# Patient Record
Sex: FEMALE | Race: BLACK OR AFRICAN AMERICAN | Employment: UNEMPLOYED | URBAN - METROPOLITAN AREA
[De-identification: names, ages, dates, MRNs, and addresses within clinical notes are randomized per-mention and may not be internally consistent; named-entity substitution may affect disease eponyms.]

---

## 2017-05-31 LAB
ANTIBODY SCREEN, EXTERNAL: NEGATIVE
CHLAMYDIA, EXTERNAL: NEGATIVE
HBSAG, EXTERNAL: NEGATIVE
HCT, EXTERNAL: 30.7
HGB, EXTERNAL: 9.9
HIV, EXTERNAL: NON REACTIVE
N. GONORRHEA, EXTERNAL: NEGATIVE
PLATELET CNT,   EXTERNAL: 229
RUBELLA, EXTERNAL: NORMAL
T. PALLIDUM, EXTERNAL: NEGATIVE
TYPE, ABO & RH, EXTERNAL: NORMAL

## 2017-10-18 LAB — GTT, 1 HR, GLUCOLA, EXTERNAL: 110

## 2017-12-13 LAB — GRBS, EXTERNAL: NEGATIVE

## 2017-12-22 ENCOUNTER — HOSPITAL ENCOUNTER (INPATIENT)
Age: 22
LOS: 3 days | Discharge: HOME | End: 2017-12-25
Attending: OBSTETRICS & GYNECOLOGY | Admitting: OBSTETRICS & GYNECOLOGY
Payer: COMMERCIAL

## 2017-12-22 ENCOUNTER — ANESTHESIA EVENT (OUTPATIENT)
Dept: LABOR AND DELIVERY | Age: 22
End: 2017-12-22
Payer: COMMERCIAL

## 2017-12-22 ENCOUNTER — ANESTHESIA (OUTPATIENT)
Dept: LABOR AND DELIVERY | Age: 22
End: 2017-12-22
Payer: COMMERCIAL

## 2017-12-22 PROBLEM — O12.10 PROTEINURIA AFFECTING PREGNANCY: Status: ACTIVE | Noted: 2017-12-22

## 2017-12-22 LAB
ALBUMIN SERPL-MCNC: 3 G/DL (ref 3.5–5)
ALBUMIN/GLOB SERPL: 0.8 {RATIO} (ref 1.1–2.2)
ALP SERPL-CCNC: 160 U/L (ref 45–117)
ALT SERPL-CCNC: 22 U/L (ref 12–78)
ANION GAP SERPL CALC-SCNC: 15 MMOL/L (ref 5–15)
AST SERPL-CCNC: 30 U/L (ref 15–37)
BASOPHILS # BLD: 0 K/UL (ref 0–0.1)
BASOPHILS NFR BLD: 0 % (ref 0–1)
BILIRUB SERPL-MCNC: 0.2 MG/DL (ref 0.2–1)
BUN SERPL-MCNC: 5 MG/DL (ref 6–20)
BUN/CREAT SERPL: 8 (ref 12–20)
CALCIUM SERPL-MCNC: 9 MG/DL (ref 8.5–10.1)
CHLORIDE SERPL-SCNC: 102 MMOL/L (ref 97–108)
CO2 SERPL-SCNC: 21 MMOL/L (ref 21–32)
CREAT SERPL-MCNC: 0.64 MG/DL (ref 0.55–1.02)
CREAT UR-MCNC: 119.99 MG/DL
EOSINOPHIL # BLD: 0 K/UL (ref 0–0.4)
EOSINOPHIL NFR BLD: 0 % (ref 0–7)
ERYTHROCYTE [DISTWIDTH] IN BLOOD BY AUTOMATED COUNT: 14.1 % (ref 11.5–14.5)
GLOBULIN SER CALC-MCNC: 3.6 G/DL (ref 2–4)
GLUCOSE SERPL-MCNC: 90 MG/DL (ref 65–100)
HCT VFR BLD AUTO: 35.5 % (ref 35–47)
HGB BLD-MCNC: 11.7 G/DL (ref 11.5–16)
LDH SERPL L TO P-CCNC: 287 U/L (ref 81–246)
LYMPHOCYTES # BLD: 1.4 K/UL (ref 0.8–3.5)
LYMPHOCYTES NFR BLD: 12 % (ref 12–49)
MCH RBC QN AUTO: 25.4 PG (ref 26–34)
MCHC RBC AUTO-ENTMCNC: 33 G/DL (ref 30–36.5)
MCV RBC AUTO: 77 FL (ref 80–99)
MONOCYTES # BLD: 1.7 K/UL (ref 0–1)
MONOCYTES NFR BLD: 14 % (ref 5–13)
NEUTS SEG # BLD: 8.9 K/UL (ref 1.8–8)
NEUTS SEG NFR BLD: 74 % (ref 32–75)
PLATELET # BLD AUTO: 255 K/UL (ref 150–400)
POTASSIUM SERPL-SCNC: 4 MMOL/L (ref 3.5–5.1)
PROT SERPL-MCNC: 6.6 G/DL (ref 6.4–8.2)
PROT UR-MCNC: 340 MG/DL (ref 0–11.9)
PROT/CREAT UR-RTO: 2.8
RBC # BLD AUTO: 4.61 M/UL (ref 3.8–5.2)
SODIUM SERPL-SCNC: 138 MMOL/L (ref 136–145)
URATE SERPL-MCNC: 4.2 MG/DL (ref 2.6–6)
WBC # BLD AUTO: 12 K/UL (ref 3.6–11)

## 2017-12-22 PROCEDURE — 85025 COMPLETE CBC W/AUTO DIFF WBC: CPT | Performed by: OBSTETRICS & GYNECOLOGY

## 2017-12-22 PROCEDURE — 74011000250 HC RX REV CODE- 250

## 2017-12-22 PROCEDURE — 75410000000 HC DELIVERY VAGINAL/SINGLE: Performed by: OBSTETRICS & GYNECOLOGY

## 2017-12-22 PROCEDURE — 99218 HC RM OBSERVATION: CPT

## 2017-12-22 PROCEDURE — 84156 ASSAY OF PROTEIN URINE: CPT | Performed by: OBSTETRICS & GYNECOLOGY

## 2017-12-22 PROCEDURE — 75410000002 HC LABOR FEE PER 1 HR: Performed by: OBSTETRICS & GYNECOLOGY

## 2017-12-22 PROCEDURE — 80053 COMPREHEN METABOLIC PANEL: CPT | Performed by: OBSTETRICS & GYNECOLOGY

## 2017-12-22 PROCEDURE — 36415 COLL VENOUS BLD VENIPUNCTURE: CPT | Performed by: OBSTETRICS & GYNECOLOGY

## 2017-12-22 PROCEDURE — 74011250636 HC RX REV CODE- 250/636: Performed by: ANESTHESIOLOGY

## 2017-12-22 PROCEDURE — 65270000029 HC RM PRIVATE

## 2017-12-22 PROCEDURE — 59200 INSERT CERVICAL DILATOR: CPT | Performed by: OBSTETRICS & GYNECOLOGY

## 2017-12-22 PROCEDURE — 84550 ASSAY OF BLOOD/URIC ACID: CPT | Performed by: OBSTETRICS & GYNECOLOGY

## 2017-12-22 PROCEDURE — 77030034850

## 2017-12-22 PROCEDURE — 75410000003 HC RECOV DEL/VAG/CSECN EA 0.5 HR: Performed by: OBSTETRICS & GYNECOLOGY

## 2017-12-22 PROCEDURE — 83615 LACTATE (LD) (LDH) ENZYME: CPT | Performed by: OBSTETRICS & GYNECOLOGY

## 2017-12-22 PROCEDURE — 99285 EMERGENCY DEPT VISIT HI MDM: CPT

## 2017-12-22 PROCEDURE — 76815 OB US LIMITED FETUS(S): CPT

## 2017-12-22 PROCEDURE — 76060000078 HC EPIDURAL ANESTHESIA: Performed by: ANESTHESIOLOGY

## 2017-12-22 PROCEDURE — 00HU33Z INSERTION OF INFUSION DEVICE INTO SPINAL CANAL, PERCUTANEOUS APPROACH: ICD-10-PCS | Performed by: ANESTHESIOLOGY

## 2017-12-22 PROCEDURE — 77030014125 HC TY EPDRL BBMI -B: Performed by: NURSE ANESTHETIST, CERTIFIED REGISTERED

## 2017-12-22 RX ORDER — LIDOCAINE HYDROCHLORIDE AND EPINEPHRINE 20; 5 MG/ML; UG/ML
INJECTION, SOLUTION EPIDURAL; INFILTRATION; INTRACAUDAL; PERINEURAL AS NEEDED
Status: DISCONTINUED | OUTPATIENT
Start: 2017-12-22 | End: 2017-12-23 | Stop reason: HOSPADM

## 2017-12-22 RX ORDER — BUPIVACAINE HYDROCHLORIDE 2.5 MG/ML
INJECTION, SOLUTION EPIDURAL; INFILTRATION; INTRACAUDAL AS NEEDED
Status: DISCONTINUED | OUTPATIENT
Start: 2017-12-22 | End: 2017-12-23 | Stop reason: HOSPADM

## 2017-12-22 RX ORDER — SODIUM CHLORIDE, SODIUM LACTATE, POTASSIUM CHLORIDE, CALCIUM CHLORIDE 600; 310; 30; 20 MG/100ML; MG/100ML; MG/100ML; MG/100ML
125 INJECTION, SOLUTION INTRAVENOUS CONTINUOUS
Status: DISCONTINUED | OUTPATIENT
Start: 2017-12-22 | End: 2017-12-25 | Stop reason: HOSPADM

## 2017-12-22 RX ORDER — FENTANYL/BUPIVACAINE/NS/PF 2-1250MCG
1-16 PREFILLED PUMP RESERVOIR EPIDURAL CONTINUOUS
Status: DISCONTINUED | OUTPATIENT
Start: 2017-12-22 | End: 2017-12-24

## 2017-12-22 RX ADMIN — BUPIVACAINE HYDROCHLORIDE 7 ML: 2.5 INJECTION, SOLUTION EPIDURAL; INFILTRATION; INTRACAUDAL at 16:48

## 2017-12-22 RX ADMIN — Medication 10 ML/HR: at 17:21

## 2017-12-22 RX ADMIN — LIDOCAINE HYDROCHLORIDE AND EPINEPHRINE 3 ML: 20; 5 INJECTION, SOLUTION EPIDURAL; INFILTRATION; INTRACAUDAL; PERINEURAL at 16:47

## 2017-12-22 RX ADMIN — SODIUM CHLORIDE, SODIUM LACTATE, POTASSIUM CHLORIDE, AND CALCIUM CHLORIDE 1000 ML: 600; 310; 30; 20 INJECTION, SOLUTION INTRAVENOUS at 15:54

## 2017-12-22 RX ADMIN — SODIUM CHLORIDE, SODIUM LACTATE, POTASSIUM CHLORIDE, AND CALCIUM CHLORIDE 1000 ML: 600; 310; 30; 20 INJECTION, SOLUTION INTRAVENOUS at 15:55

## 2017-12-22 NOTE — PROGRESS NOTES
1051 Patient arrived from Dr Miriam Landon office to rule out Pre- at this time the patient denies, epigastric pain, visual disturbance, headaches, SOB, chest pain, N/V, vaginal bleeding or discharge. Patient does feel cramping and EFM picks them up as contractions, mild on palpation. Blood was drawn by RN and urine collected for testing. Serial BP started. 20ga IV placed in right hand, patient tolerated well. IV is flushed and saline locked. RN will wait for labs to result and inform MD.     1218. RN called Dr Marlyn Ramirez to update him on patient's lab results and blood pressures. RN received orders to leave patient on monitor since she is contraction and MD will reevaluate in a few hours, will determine plan of care at that time. Will continue to monitor. 1430 Dr. Marlyn Ramirez at the bedside assessing the patient. Stanton bulb placed, 60 mL of normal saline inserted in the balloon. Patient tolerated well. Received orders to admit patient and continuous EFM because of contractions. Will continue to monitor. 1550 Sinosodial pattern noticed on fetal monitoring and patient in obvious discomfort with stanton bulb in place. Notified Dr Catracho Murphy. Received orders to start blousing patient for epidural. Anesthesia notified by Jad Willingham RN. Bolus fluid started. Will continue to monitor. 95 [facility], CRNA att he bedside assessing patient for epidural. Patient gave consent for epidural anmd placed in a proper position. Fer EspinosaTono 123 done with Issac De La O RN patient and . All agree. Patient tolerated procedure well. After placement patient was placed in supine position with right hip tilt. 1912 Bedside and Verbal shift change report given to Radha Soto RN (oncoming nurse) by Windy Johnson RN (offgoing nurse). Report included the following information SBAR, Kardex and MAR.

## 2017-12-22 NOTE — H&P
EDC:2018  EGA: 37 weeks, 4 days      Vital Signs   20Years Old Female  Weight: 187.7 pounds  BP:       130/86  Hospital of delivery: [address] [place] [address]    Pap/HPV/Gardasil History   Gardasil Injection History: Pt Advised/Considering Gardasil    Patient's Prenatal Care with Doctor of Record Rubi Purcell MD Notable For -    Proteinuria pregnant  Dysuria  Anemia-On FE-pregnant  Normal pregnancy primigravida,, XX \"Seraphina (s/p?)\"   lab screening  Family History of Colon Cancer                  Allergies    This patient has no known allergies. Medications Removed from Medication List        167 [facility] for Follow-up Visit     Estimated weeks of        gestation:  37 4/7     Weight:  187. 7     Blood pressure: 130 / 86     Urine Protein:  2+     Urine Glucose: N     Headache:  No     Nausea/vomiting: No     Edema: TrLE     Vaginal bleeding: no     Vaginal discharge: no     Fetal activity:  yes     Fundal height:    38     FHR:   156     Labor symptoms: cramping     Fetal position:  vertex     Cx Dilation:  1     Cx Effacement: 50%     Cx Station:  -3     Next visit:  1 wk     Preceptor:  gary     Comment:  Visual changes and lightheaded/doesn't feel like herself. Ur pr/cr ratio 1.95 - suspect atypical preE w/out severe sxs however to confirm will send to L&D for HELLP labs and serial BPs. Discussed possibility of IOL if rules in for PreE. GBS negative. Active FM. Impression & Recommendations:    Problem # 1:  Proteinuria pregnant (HANS-011.04) (LIG48-T17.13)  New onset proteinuria with Urine pr/cr ratio of 1.95 from Wednesday, new onset visual changes, BP elevated from baseline (typically 100s/60s-70s and 130/86 today). Possible atypical PreE w/out severe symptoms - will send to L&D for Pre E work up including HELLP labs and serial BPs. Discussed possible IOL if rules in for PreE.       Orders:  Patient Sent to L&D (CPT-LD)  Sent to L&D  (CPT-AdmitF)  Antepartum Care (CPT-10)        Medications (at conclusion of this visit)    10/23/2017 IRON SUPPLEMENT TABLET (FERROUS SULFATE TABS) 1 po qd  01/05/2017 PNV-DHA CAPSULE (PRENAT W/O [id number]-[id number] CAPS)           Primary Provider:  Sulma Romeo MD    CC:  proteinuria and not feeling well. History of Present Illness:  Pt is a G1 at 40 4/7 who presents for f/u of proteinuria (new onset from last visit) and ur pr/cr ratio of 1.95. She also noted a HA at last visit on Wednesday prompting work-up for PreE. Her BP today is elevated from her baseline but not technically high based on normal criteria. She also notes some visual changes in terms of seeing spots and just overall \"feeling not like herself\". 2lb weight gain over 2 days. Notes active FM. Past Medical History:     Reviewed history from 01/05/2017 and no changes required:        social anxiety disorder         kidney stone 2016     Past Surgical History:     Reviewed history from 01/05/2017 and no changes required:        negative     Family History Summary:      Reviewed history Last on 11/15/2017 and no changes required:12/22/2017      General Comments - FH:  Family history transferred to [address] [place] [address]     Social History:     Reviewed history from 01/05/2017 and no changes required:         [id number]         homemaker                 Smoking History:        Patient has never smoked.       Risk Factors:     Dietary Counseling: no    Previous Tobacco Use: Signed On - 05/31/2017  Smoked Tobacco Use:  Never smoker  Smokeless Tobacco Use:  Never  Passive smoke exposure:  no  Drug use:  no  HIV high-risk behavior:  no  Caffeine use:  0 drinks per day    Previous Alcohol Use: Signed On - 05/31/2017  Alcohol use:  no  Exercise:  no  Seatbelt use:  100 %  Sun Exposure:  rarely    Family History Risk Factors:     Family History of MI in females < 72years old:  no     Family History of MI in males < 54years old:  no    Dietary Counseling: no    PAP Smear History:     Date of Last PAP Smear:  03/02/2017      Review of Systems        See HPI    Except as noted in the HPI, the review of systems is negative for General, Breast, , CV, Resp, GI, Endo, MS, Derm, Neuro, Psych, Eyes, ENT, Allergy and Heme. Vital Signs    Blood Pressure: 130 / 86    Weight:  187.7 pounds      Physical Exam     General           General appearance:  no acute distress    Head           Inspection:   normal    Eyes           External:   EOM intact    ENT           Dental:   adequate dentition    Chest           Lungs:  clear to auscultation          Heart:  regular rate and rhythm    Extremeties           Extremeties:  0 edema    Neurological           Reflexes:  2+ and symmetric with no pathological reflexes    Psych           Orientation:  oriented to time, place, and person          Mood:  no appearance of anxiety, depression, or agitation    Lymph           Inguinal:  no inguinal adenopathy    Skin           Inspection:  no rashes, suspicious lesions, or ulcerations    Abdomen           Abdomen:  gravid          Fundal Height:  38    Pelvic Exam           EGBUS:  no lesions          Vagina:  normal appearing without lesions or discharge          Uterus:  gravid          Cervix:  no lesions or discharge                  Dilation: : 1                  Effacement:  50%                  Station:  -3                  Presentation:  vertex    Allergies    This patient has no known allergies. Medications Removed from Medication List        Impression & Recommendations:    Problem # 1:  Proteinuria pregnant (SWS-638.43) (YRH78-Y94.13)  New onset proteinuria with Urine pr/cr ratio of 1.95 from Wednesday, new onset visual changes, BP elevated from baseline (typically 100s/60s-70s and 130/86 today). Possible atypical PreE w/out severe symptoms - will send to L&D for Pre E work up including HELLP labs and serial BPs. Discussed possible IOL if rules in for PreE.       Orders:  Patient Sent to L&D (CPT-LD)  Sent to L&D  (CPT-AdmitF)  Antepartum Care (CPT-10)        Medications (at conclusion of this visit)    10/23/2017 IRON SUPPLEMENT TABLET (FERROUS SULFATE TABS) 1 po qd  01/05/2017 PNV-DHA CAPSULE (PRENAT W/O [id number] CAPS)           LABORATORY DATA   TEST DATE RESULT   Group B Strep culture 12/13/2017 Negative                                   (Group B Strep Culture Result Field)   Blood Type 05/31/2017 B                                             (Blood Type Result Field)   Rh 05/31/2017 Positive                                   (Rh Result Field)   Rhogam Inj Given     Tdap Vaccine Given 10/18/2017 Vacc.  [address]   Antibody Screen 10/18/2017 Negative   Rubella  [facility] Reference Ranges On or After 3/10/14                  <0.90              Non-immune      0.90 - 0.99     Equivocal      >0.99              Immune    [facility] Reference Ranges  Before 3/10/14           <5                 Non-immune             5 - 9               Equivocal            >9                 Immune  [facility] Reference Ranges       < Or = 0.90       Negative             0.91-1.09          Equivocal            > Or = 1.10       Positive   05/31/2017     17.30     TPA (T Pallidum Antibodies) 10/18/2017 Negative   Serology (RPR)     HBsAg 05/31/2017 Negative   HIV 05/31/2017 Non Reactive   Hemoglobin 12/20/2017 11.2   Hematocrit 12/20/2017 35.3   Platelets 16/01/4512 250 X10E3/UL   TSH 01/05/2017 2.180   Urine Culture 11/01/2017 Negative   GC DNA Probe 01/05/2017 Negative   Chlamydia DNA 01/05/2017 Negative   PAP 03/02/2017 NIL   Flu Vaccine Given 12/13/2017 declined   HGBA1C     HGB Electro     T4, Free     BG Fasting     GTT 1H 50G 10/18/2017 110   GTT 1H 100G     GTT 2H 100G     GTT 3H 100G     Glucose Plasma     CF Accept or Decline 05/31/2017 declined   CF Screen Result     Nuchal Trans 05/31/2017 declined   AFP Only 05/31/2017 Declined   Tetra 08/07/2017 Declined   AFP Serum     CVS 05/31/2017 declined   AFP Amniotic Amnio Karyo 05/31/2017 declined   FISH     GC Culture     Chlamydia Cult     Ureaplasma     Mycoplasma     WBC 12/20/2017 10.7 X10E3/UL   RBC 12/20/2017 4.53 X10E6/UL   MCV 12/20/2017 78   MCH 12/20/2017 24.7   MCHC RBC 12/20/2017 31.7     ULTRASOUND DATA   TEST DATE RESULT   Estimated Fetal Weight 08/24/2017 922.25788056^982 g&grams                                     Weight % 08/24/2017 39^39% %&percent                                                WHIT 08/24/2017 13.79^20.8 cm&centimeters                    BPP     Cervical Length (mm)       ]      Electronically signed by Cuco Rollins MD on 12/22/2017 at 10:40 AM    ________________________________________________________________________

## 2017-12-22 NOTE — ANESTHESIA PROCEDURE NOTES
Epidural Block    Start time: 12/22/2017 4:32 PM  End time: 12/22/2017 4:48 PM  Performed by: Kathrin Simental  Authorized by: Gus SCHULZ     Pre-Procedure  Indication: labor epidural    Preanesthetic Checklist: patient identified, risks and benefits discussed, anesthesia consent, timeout performed and anesthesia consent    Timeout Time: 16:32        Epidural:   Patient position:  Seated  Prep region:  Lumbar  Prep: Betadine    Location:  L3-4    Needle and Epidural Catheter:   Needle Type:  Tuohy  Needle Gauge:  17 G  Injection Technique:  Loss of resistance using saline  Attempts:  1  Catheter Size:  18 G  Catheter at Skin Depth (cm):  10  Depth in Epidural Space (cm):  5  Events: no blood with aspiration, no cerebrospinal fluid with aspiration, no paresthesia and negative aspiration test    Test Dose:  Lidocaine 1.5% w/ epi and negative    Assessment:   Catheter Secured:  Tegaderm and tape  Insertion:  Uncomplicated  Patient tolerance:  Patient tolerated the procedure well with no immediate complications

## 2017-12-22 NOTE — PROGRESS NOTES
Patient seen after being evaluated in office for concerns of preE at term  Patient currently without headache or visions changes or RUQ pain  Visit Vitals    /79 130's-150's/60-90's    Pulse 74    Temp 98.4 °F (36.9 °C)    Resp 18    Ht 5' 9.5\" (1.765 m)    Wt 84.8 kg (187 lb)    SpO2 96%    BMI 27.22 kg/m2      Recent Results (from the past 12 hour(s))   CBC WITH AUTOMATED DIFF    Collection Time: 12/22/17 11:03 AM   Result Value Ref Range    WBC 12.0 (H) 3.6 - 11.0 K/uL    RBC 4.61 3.80 - 5.20 M/uL    HGB 11.7 11.5 - 16.0 g/dL    HCT 35.5 35.0 - 47.0 %    MCV 77.0 (L) 80.0 - 99.0 FL    MCH 25.4 (L) 26.0 - 34.0 PG    MCHC 33.0 30.0 - 36.5 g/dL    RDW 14.1 11.5 - 14.5 %    PLATELET 626 946 - 592 K/uL    NEUTROPHILS 74 32 - 75 %    LYMPHOCYTES 12 12 - 49 %    MONOCYTES 14 (H) 5 - 13 %    EOSINOPHILS 0 0 - 7 %    BASOPHILS 0 0 - 1 %    ABS. NEUTROPHILS 8.9 (H) 1.8 - 8.0 K/UL    ABS. LYMPHOCYTES 1.4 0.8 - 3.5 K/UL    ABS. MONOCYTES 1.7 (H) 0.0 - 1.0 K/UL    ABS. EOSINOPHILS 0.0 0.0 - 0.4 K/UL    ABS. BASOPHILS 0.0 0.0 - 0.1 K/UL   METABOLIC PANEL, COMPREHENSIVE    Collection Time: 12/22/17 11:03 AM   Result Value Ref Range    Sodium 138 136 - 145 mmol/L    Potassium 4.0 3.5 - 5.1 mmol/L    Chloride 102 97 - 108 mmol/L    CO2 21 21 - 32 mmol/L    Anion gap 15 5 - 15 mmol/L    Glucose 90 65 - 100 mg/dL    BUN 5 (L) 6 - 20 MG/DL    Creatinine 0.64 0.55 - 1.02 MG/DL    BUN/Creatinine ratio 8 (L) 12 - 20      GFR est AA >60 >60 ml/min/1.73m2    GFR est non-AA >60 >60 ml/min/1.73m2    Calcium 9.0 8.5 - 10.1 MG/DL    Bilirubin, total 0.2 0.2 - 1.0 MG/DL    ALT (SGPT) 22 12 - 78 U/L    AST (SGOT) 30 15 - 37 U/L    Alk.  phosphatase 160 (H) 45 - 117 U/L    Protein, total 6.6 6.4 - 8.2 g/dL    Albumin 3.0 (L) 3.5 - 5.0 g/dL    Globulin 3.6 2.0 - 4.0 g/dL    A-G Ratio 0.8 (L) 1.1 - 2.2     PROTEIN/CREATININE RATIO, URINE    Collection Time: 12/22/17 11:03 AM   Result Value Ref Range    Protein, urine random 340 (H) 0.0 - 11.9 mg/dL    Creatinine, urine 119.99 mg/dL    Protein/Creat. urine Ratio 2.8     URIC ACID    Collection Time: 12/22/17 11:03 AM   Result Value Ref Range    Uric acid 4.2 2.6 - 6.0 MG/DL   LD    Collection Time: 12/22/17 11:03 AM   Result Value Ref Range     (H) 81 - 246 U/L      Patient Vitals for the past 4 hrs:    Mode Fetal Heart Rate Variability Decelerations Accelerations RN Reviewed Strip?            12/22/17 1230 - 145 6-25 BPM None Yes Yes   12/22/17 1200 External 145 6-25 BPM None Yes Yes   12/22/17 1130 External 150 6-25 BPM None Yes Yes    Ella every 5 minute  With category I tracing  After review of BP's and labs confirmed preE  Patient counseled on recommendations and consents to ripening and IOL due to preE without severe features at term  Jain bulb placed with ease using speculum and ring forceps and inflated with 60 cc's of sterile fluid, patient tolerated well and appropriate placement confirmed with exam  Will monitor and if maternal fetal status good will allow to eat  Will hand off care to hospitalist

## 2017-12-22 NOTE — IP AVS SNAPSHOT
Summary of Care Report The Summary of Care report has been created to help improve care coordination. Users with access to [employer] or [address] (Web-based application) may access additional patient information including the Discharge Summary. If you are not currently a [address] user and need more information, please call the number listed below in the [place] [address] section and ask to be connected with Medical Records. Facility Information Name Address Phone [address] [place] [id number] [phone] Patient Information Patient Name Sex  León Gutierrez ([id number]) Female 1995 Discharge Information Admitting Provider Service Area Unit Aracelis Miller MD / [phone] [facility] 3 Mother Infant / [phone] Discharge Provider Discharge Date/Time Discharge Disposition Destination (none) (none) (none) (none) Patient Language Language ENGLISH [13] Hospital Problems as of 2017  Never Reviewed Class Noted - Resolved Last Modified POA Active Problems Proteinuria affecting pregnancy  2017 - Present 2017 by Aracelis Miller MD Unknown Entered by Aracelis Miller MD  
  Normal labor and delivery  2017 - Present 2017 by Aracelis Miller MD Unknown Entered by Aracelis Miller MD  
  
You are allergic to the following No active allergies Current Discharge Medication List  
  
ASK your doctor about these medications Dose & Instructions Dispensing Information Comments HYDROcodone-acetaminophen 5-325 mg per tablet Commonly known as:  Chandni Oyster Dose:  1 Tab Take 1 Tab by mouth every four (4) hours as needed for Pain. Max Daily Amount: 6 Tabs. Quantity:  20 Tab Refills:  0  
   
 ondansetron 4 mg disintegrating tablet Commonly known as:  ZOFRAN ODT Dose:  4 mg Take 1 Tab by mouth every eight (8) hours as needed for Nausea. Quantity:  10 Tab Refills:  0 PRENATAL DHA+COMPLETE PRENATAL -300 mg-mcg-mg Cmpk Generic drug:  [id number] jake-folic-dha Take  by mouth. Refills:  0 Surgery Information ID Date/Time Status Primary Surgeon All Procedures Location [id number] 12/22/2017 Complete   [facility] - DO NOT SCHEDULE Follow-up Information None Discharge Instructions Vaginal Childbirth: Care Instructions Your Care Instructions Your body will slowly heal in the next few weeks. It is easy to get too tired and overwhelmed during the first weeks after your baby is born. Changes in your hormones can shift your mood without warning. You may find it hard to meet the extra demands on your energy and time. Take it easy on yourself. Follow-up care is a key part of your treatment and safety. Be sure to make and go to all appointments, and call your doctor if you are having problems. It's also a good idea to know your test results and keep a list of the medicines you take. How can you care for yourself at home? · Vaginal bleeding and cramps ¨ After delivery, you will have a bloody discharge from the vagina. This will turn pink within a week and then white or yellow after about 10 days. It may last for 2 to 4 weeks or longer, until the uterus has healed. Use pads instead of tampons until you stop bleeding. ¨ Do not worry if you pass some blood clots, as long as they are smaller than a golf ball. If you have a tear or stitches in your vaginal area, change the pad at least every 4 hours to prevent soreness and infection. ¨ You may have cramps for the first few days after childbirth. These are normal and occur as the uterus shrinks to normal size.  Take an over-the-counter pain medicine, such as acetaminophen (Tylenol), ibuprofen (Advil, Motrin), or naproxen (Aleve), for cramps. Read and follow all instructions on the label. Do not take aspirin, because it can cause more bleeding. ¨ Do not take two or more pain medicines at the same time unless the doctor told you to. Many pain medicines have acetaminophen, which is Tylenol. Too much acetaminophen (Tylenol) can be harmful. · Stitches ¨ If you have stitches, they will dissolve on their own and do not need to be removed. Follow your doctor's instructions for cleaning the stitched area. ¨ Put ice or a cold pack on your painful area for 10 to 20 minutes at a time, several times a day, for the first few days. Put a thin cloth between the ice and your skin. ¨ Sit in a few inches of warm water (sitz bath) 3 times a day and after bowel movements. The warm water helps with pain and itching. If you do not have a tub, a warm shower might help. · Breast fullness ¨ Your breasts may overfill (engorge) in the first few days after delivery. To help milk flow and to relieve pain, warm your breasts in the shower or by using warm, moist towels before nursing. ¨ If you are not nursing, do not put warmth on your breasts or touch your breasts. Wear a tight bra or sports bra and use ice until the fullness goes away. This usually takes 2 to 3 days. ¨ Put ice or a cold pack on your breast after nursing to reduce swelling and pain. Put a thin cloth between the ice and your skin. · Activity ¨ Eat a balanced diet. Do not try to lose weight by cutting calories. Keep taking your prenatal vitamins, or take a multivitamin. ¨ Get as much rest as you can. Try to take naps when your baby sleeps during the day. ¨ Get some exercise every day. But do not do any heavy exercise until your doctor says it is okay. ¨ Wait until you are healed (about 4 to 6 weeks) before you have sexual intercourse. Your doctor will tell you when it is okay to have sex. ¨ Talk to your doctor about birth control. You can get pregnant even before your period returns. Also, you can get pregnant while you are breastfeeding. · Mental health ¨ It is normal to have some sadness, anxiety, sleeplessness, and mood swings after you go home. If you feel upset or hopeless for more than a few days or are having trouble doing the things you need to do, talk to your doctor. · Constipation and hemorrhoids ¨ Drink plenty of fluids, enough so that your urine is light yellow or clear like water. If you have kidney, heart, or liver disease and have to limit fluids, talk with your doctor before you increase the amount of fluids you drink. ¨ Eat plenty of fiber each day. Have a bran muffin or bran cereal for breakfast, and try eating a piece of fruit for a mid-afternoon snack. ¨ For painful, itchy hemorrhoids, put ice or a cold pack on the area several times a day for 10 minutes at a time. Follow this by putting a warm compress on the area for another 10 to 20 minutes or by sitting in a shallow, warm bath. When should you call for help? Call 911 anytime you think you may need emergency care. For example, call if: 
? · You passed out (lost consciousness). ?Call your doctor now or seek immediate medical care if: 
? · You have severe vaginal bleeding. ? · You are dizzy or lightheaded, or you feel like you may faint. ? · You have a fever. ? · You have new or more pain in your belly or pelvis. ? Watch closely for changes in your health, and be sure to contact your doctor if: 
? · Your vaginal bleeding seems to be getting heavier. ? · You have new or worse vaginal discharge. ? · You feel sad, anxious, or hopeless for more than a few days. ? · You do not get better as expected. Where can you learn more? Go to http://musa-priscilla.info/. Enter R492 in the search box to learn more about \"Vaginal Childbirth: Care Instructions. \" Current as of: March 16, 2017 Content Version: 11.4 © 7332-5840 [employer]. Care instructions adapted under license by [employer] (which disclaims liability or warranty for this information). If you have questions about a medical condition or this instruction, always ask your healthcare professional. [address] any warranty or liability for your use of this information. Chart Review Routing History No Routing History on File

## 2017-12-22 NOTE — IP AVS SNAPSHOT
[address] 
[phone] Patient: Fifi Palafox MRN: [id number] :1995 My Medications ASK your physician about these medications Instructions Each Dose to Equal  
 Morning Noon Evening Bedtime HYDROcodone-acetaminophen 5-325 mg per tablet Commonly known as:  Ver[place] Jose Your last dose was: Your next dose is: Take 1 Tab by mouth every four (4) hours as needed for Pain. Max Daily Amount: 6 Tabs. 1 Tab  
    
   
   
   
  
 ondansetron 4 mg disintegrating tablet Commonly known as:  ZOFRAN ODT Your last dose was: Your next dose is: Take 1 Tab by mouth every eight (8) hours as needed for Nausea. 4 mg PRENATAL DHA+COMPLETE PRENATAL -300 mg-mcg-mg Cmpk Generic drug:  [id number] jake-folic-dha Your last dose was: Your next dose is: Take  by mouth.

## 2017-12-22 NOTE — ANESTHESIA PREPROCEDURE EVALUATION
Anesthetic History   No history of anesthetic complications            Review of Systems / Medical History  Patient summary reviewed, nursing notes reviewed and pertinent labs reviewed    Pulmonary  Within defined limits                 Neuro/Psych         Psychiatric history    Comments: Panic attacks Cardiovascular    Hypertension              Exercise tolerance: >4 METS     GI/Hepatic/Renal     GERD: poorly controlled           Endo/Other  Within defined limits           Other Findings              Physical Exam    Airway  Mallampati: II  TM Distance: 4 - 6 cm  Neck ROM: normal range of motion   Mouth opening: Normal     Cardiovascular  Regular rate and rhythm,  S1 and S2 normal,  no murmur, click, rub, or gallop  Rhythm: regular  Rate: normal         Dental  No notable dental hx       Pulmonary  Breath sounds clear to auscultation               Abdominal  GI exam deferred       Other Findings            Anesthetic Plan    ASA: 2  Anesthesia type: epidural            Anesthetic plan and risks discussed with: Patient and Family      Plan discussed with patient including risks.  Patient ready for anesthesia prior to placement of epidural.

## 2017-12-22 NOTE — IP AVS SNAPSHOT
[address] 
[phone] Patient: Librado Bui MRN: [id number] :1995 About your hospitalization You were admitted on:  2017 You last received care in the:  [facility] You were discharged on:  2017 Why you were hospitalized Your primary diagnosis was:  Not on File Your diagnoses also included:  Proteinuria Affecting Pregnancy, Normal Labor And Delivery Discharge Orders None A check pepe indicates which time of day the medication should be taken. My Medications ASK your physician about these medications Instructions Each Dose to Equal  
 Morning Noon Evening Bedtime HYDROcodone-acetaminophen 5-325 mg per tablet Commonly known as:  Inessa Hollis Your last dose was: Your next dose is: Take 1 Tab by mouth every four (4) hours as needed for Pain. Max Daily Amount: 6 Tabs. 1 Tab  
    
   
   
   
  
 ondansetron 4 mg disintegrating tablet Commonly known as:  ZOFRAN ODT Your last dose was: Your next dose is: Take 1 Tab by mouth every eight (8) hours as needed for Nausea. 4 mg PRENATAL DHA+COMPLETE PRENATAL -300 mg-mcg-mg Cmpk Generic drug:  [id number] jake-folic-dha Your last dose was: Your next dose is: Take  by mouth. Discharge Instructions Vaginal Childbirth: Care Instructions Your Care Instructions Your body will slowly heal in the next few weeks. It is easy to get too tired and overwhelmed during the first weeks after your baby is born. Changes in your hormones can shift your mood without warning. You may find it hard to meet the extra demands on your energy and time. Take it easy on yourself. Follow-up care is a key part of your treatment and safety. Be sure to make and go to all appointments, and call your doctor if you are having problems. It's also a good idea to know your test results and keep a list of the medicines you take. How can you care for yourself at home? · Vaginal bleeding and cramps ¨ After delivery, you will have a bloody discharge from the vagina. This will turn pink within a week and then white or yellow after about 10 days. It may last for 2 to 4 weeks or longer, until the uterus has healed. Use pads instead of tampons until you stop bleeding. ¨ Do not worry if you pass some blood clots, as long as they are smaller than a golf ball. If you have a tear or stitches in your vaginal area, change the pad at least every 4 hours to prevent soreness and infection. ¨ You may have cramps for the first few days after childbirth. These are normal and occur as the uterus shrinks to normal size. Take an over-the-counter pain medicine, such as acetaminophen (Tylenol), ibuprofen (Advil, Motrin), or naproxen (Aleve), for cramps. Read and follow all instructions on the label. Do not take aspirin, because it can cause more bleeding. ¨ Do not take two or more pain medicines at the same time unless the doctor told you to. Many pain medicines have acetaminophen, which is Tylenol. Too much acetaminophen (Tylenol) can be harmful. · Stitches ¨ If you have stitches, they will dissolve on their own and do not need to be removed. Follow your doctor's instructions for cleaning the stitched area. ¨ Put ice or a cold pack on your painful area for 10 to 20 minutes at a time, several times a day, for the first few days. Put a thin cloth between the ice and your skin. ¨ Sit in a few inches of warm water (sitz bath) 3 times a day and after bowel movements. The warm water helps with pain and itching. If you do not have a tub, a warm shower might help. · Breast fullness ¨ Your breasts may overfill (engorge) in the first few days after delivery. To help milk flow and to relieve pain, warm your breasts in the shower or by using warm, moist towels before nursing. ¨ If you are not nursing, do not put warmth on your breasts or touch your breasts. Wear a tight bra or sports bra and use ice until the fullness goes away. This usually takes 2 to 3 days. ¨ Put ice or a cold pack on your breast after nursing to reduce swelling and pain. Put a thin cloth between the ice and your skin. · Activity ¨ Eat a balanced diet. Do not try to lose weight by cutting calories. Keep taking your prenatal vitamins, or take a multivitamin. ¨ Get as much rest as you can. Try to take naps when your baby sleeps during the day. ¨ Get some exercise every day. But do not do any heavy exercise until your doctor says it is okay. ¨ Wait until you are healed (about 4 to 6 weeks) before you have sexual intercourse. Your doctor will tell you when it is okay to have sex. ¨ Talk to your doctor about birth control. You can get pregnant even before your period returns. Also, you can get pregnant while you are breastfeeding. · Mental health ¨ It is normal to have some sadness, anxiety, sleeplessness, and mood swings after you go home. If you feel upset or hopeless for more than a few days or are having trouble doing the things you need to do, talk to your doctor. · Constipation and hemorrhoids ¨ Drink plenty of fluids, enough so that your urine is light yellow or clear like water. If you have kidney, heart, or liver disease and have to limit fluids, talk with your doctor before you increase the amount of fluids you drink. ¨ Eat plenty of fiber each day. Have a bran muffin or bran cereal for breakfast, and try eating a piece of fruit for a mid-afternoon snack. ¨ For painful, itchy hemorrhoids, put ice or a cold pack on the area several times a day for 10 minutes at a time.  Follow this by putting a warm compress on the area for another 10 to 20 minutes or by sitting in a shallow, warm bath. When should you call for help? Call 911 anytime you think you may need emergency care. For example, call if: 
? · You passed out (lost consciousness). ?Call your doctor now or seek immediate medical care if: 
? · You have severe vaginal bleeding. ? · You are dizzy or lightheaded, or you feel like you may faint. ? · You have a fever. ? · You have new or more pain in your belly or pelvis. ? Watch closely for changes in your health, and be sure to contact your doctor if: 
? · Your vaginal bleeding seems to be getting heavier. ? · You have new or worse vaginal discharge. ? · You feel sad, anxious, or hopeless for more than a few days. ? · You do not get better as expected. Where can you learn more? Go to http://musa-priscilla.info/. Enter U390 in the search box to learn more about \"Vaginal Childbirth: Care Instructions. \" Current as of: March 16, 2017 Content Version: 11.4 © 2468-3619 [employer]. Care instructions adapted under license by [employer] (which disclaims liability or warranty for this information). If you have questions about a medical condition or this instruction, always ask your healthcare professional. [address] any warranty or liability for your use of this information. Introducing [facility]! [facility] introduces LumiFold patient portal. Now you can access parts of your medical record, email your doctor's office, and request medication refills online. 1. In your internet browser, go to [email or web address] 2. Click on the First Time User? Click Here link in the Sign In box. You will see the New Member Sign Up page. 3. Enter your LumiFold Access Code exactly as it appears below. You will not need to use this code after youve completed the sign-up process.  If you do not sign up before the expiration date, you must request a new code. · Hytle Access Code: [email or web address] Expires: 3/25/2018  7:24 AM 
 
4. Enter the last four digits of your Social Security Number (xxxx) and Date of Birth (mm/dd/yyyy) as indicated and click Submit. You will be taken to the next sign-up page. 5. Create a Hytle ID. This will be your Hytle login ID and cannot be changed, so think of one that is secure and easy to remember. 6. Create a Hytle password. You can change your password at any time. 7. Enter your Password Reset Question and Answer. This can be used at a later time if you forget your password. 8. Enter your e-mail address. You will receive e-mail notification when new information is available in [address] 9. Click Sign Up. You can now view and download portions of your medical record. 10. Click the Download Summary menu link to download a portable copy of your medical information. If you have questions, please visit the Frequently Asked Questions section of the Hytle website. Remember, Hytle is NOT to be used for urgent needs. For medical emergencies, dial 911. Now available from your iPhone and Android! Providers Seen During Your Hospitalization Provider Specialty Primary office phone Lynnette Villagomez MD Obstetrics & Gynecology [phone] Dawn Coker MD Obstetrics & Gynecology [phone] Your Primary Care Physician (PCP) Primary Care Physician Office Phone Office Fax OTHER, PHYS ** None ** ** None ** You are allergic to the following No active allergies Recent Documentation Height Weight Breastfeeding? BMI OB Status Smoking Status 1.765 m 84.8 kg Unknown 27.22 kg/m2 Recent pregnancy Never Smoker Emergency Contacts Name Discharge Info Relation Home Work Mobile [place] DISCHARGE CAREGIVER [3] Spouse [3] [phone] Dheeraj Bone DISCHARGE CAREGIVER [3] Father [15]   [phone] Patient Belongings The following personal items are in your possession at time of discharge: 
  Dental Appliances: None         Home Medications: None   Jewelry: Earrings  Clothing: Footwear, Jacket/Coat, Pants, Shirt, Socks, Undergarments, With patient    Other Valuables: [employer] Please provide this summary of care documentation to your next provider. Signatures-by signing, you are acknowledging that this After Visit Summary has been reviewed with you and you have received a copy. Patient Signature:  ____________________________________________________________ Date:  ____________________________________________________________  
  
[facility] Provider Signature:  ____________________________________________________________ Date:  ____________________________________________________________

## 2017-12-23 PROCEDURE — 75410000002 HC LABOR FEE PER 1 HR: Performed by: OBSTETRICS & GYNECOLOGY

## 2017-12-23 PROCEDURE — 77030021125

## 2017-12-23 PROCEDURE — 74011250636 HC RX REV CODE- 250/636: Performed by: OBSTETRICS & GYNECOLOGY

## 2017-12-23 PROCEDURE — 65270000029 HC RM PRIVATE

## 2017-12-23 PROCEDURE — 74011250637 HC RX REV CODE- 250/637: Performed by: OBSTETRICS & GYNECOLOGY

## 2017-12-23 PROCEDURE — 74011250636 HC RX REV CODE- 250/636: Performed by: ANESTHESIOLOGY

## 2017-12-23 PROCEDURE — 10907ZC DRAINAGE OF AMNIOTIC FLUID, THERAPEUTIC FROM PRODUCTS OF CONCEPTION, VIA NATURAL OR ARTIFICIAL OPENING: ICD-10-PCS | Performed by: OBSTETRICS & GYNECOLOGY

## 2017-12-23 PROCEDURE — 3E033VJ INTRODUCTION OF OTHER HORMONE INTO PERIPHERAL VEIN, PERCUTANEOUS APPROACH: ICD-10-PCS | Performed by: OBSTETRICS & GYNECOLOGY

## 2017-12-23 RX ORDER — PROMETHAZINE HYDROCHLORIDE 25 MG/1
25 TABLET ORAL
Status: DISCONTINUED | OUTPATIENT
Start: 2017-12-23 | End: 2017-12-25 | Stop reason: HOSPADM

## 2017-12-23 RX ORDER — METHYLERGONOVINE MALEATE 0.2 MG/ML
0.2 INJECTION INTRAVENOUS ONCE
Status: COMPLETED | OUTPATIENT
Start: 2017-12-23 | End: 2017-12-23

## 2017-12-23 RX ORDER — IBUPROFEN 800 MG/1
800 TABLET ORAL EVERY 8 HOURS
Status: DISCONTINUED | OUTPATIENT
Start: 2017-12-23 | End: 2017-12-25 | Stop reason: HOSPADM

## 2017-12-23 RX ORDER — HYDROMORPHONE HYDROCHLORIDE 2 MG/ML
1 INJECTION, SOLUTION INTRAMUSCULAR; INTRAVENOUS; SUBCUTANEOUS
Status: DISCONTINUED | OUTPATIENT
Start: 2017-12-23 | End: 2017-12-25 | Stop reason: HOSPADM

## 2017-12-23 RX ORDER — OXYTOCIN/RINGER'S LACTATE 20/1000 ML
125-500 PLASTIC BAG, INJECTION (ML) INTRAVENOUS ONCE
Status: COMPLETED | OUTPATIENT
Start: 2017-12-23 | End: 2017-12-23

## 2017-12-23 RX ORDER — OXYCODONE AND ACETAMINOPHEN 5; 325 MG/1; MG/1
1 TABLET ORAL
Status: DISCONTINUED | OUTPATIENT
Start: 2017-12-23 | End: 2017-12-25 | Stop reason: HOSPADM

## 2017-12-23 RX ORDER — NALOXONE HYDROCHLORIDE 0.4 MG/ML
0.4 INJECTION, SOLUTION INTRAMUSCULAR; INTRAVENOUS; SUBCUTANEOUS AS NEEDED
Status: DISCONTINUED | OUTPATIENT
Start: 2017-12-23 | End: 2017-12-25 | Stop reason: HOSPADM

## 2017-12-23 RX ORDER — ZOLPIDEM TARTRATE 5 MG/1
5 TABLET ORAL
Status: DISCONTINUED | OUTPATIENT
Start: 2017-12-23 | End: 2017-12-25 | Stop reason: HOSPADM

## 2017-12-23 RX ORDER — ACETAMINOPHEN 325 MG/1
650 TABLET ORAL
Status: DISCONTINUED | OUTPATIENT
Start: 2017-12-23 | End: 2017-12-25 | Stop reason: HOSPADM

## 2017-12-23 RX ORDER — OXYTOCIN IN 5 % DEXTROSE 30/500 ML
.5-2 PLASTIC BAG, INJECTION (ML) INTRAVENOUS
Status: DISCONTINUED | OUTPATIENT
Start: 2017-12-23 | End: 2017-12-25 | Stop reason: HOSPADM

## 2017-12-23 RX ORDER — HYDROCORTISONE ACETATE PRAMOXINE HCL 2.5; 1 G/100G; G/100G
CREAM TOPICAL AS NEEDED
Status: DISCONTINUED | OUTPATIENT
Start: 2017-12-23 | End: 2017-12-25 | Stop reason: HOSPADM

## 2017-12-23 RX ORDER — SIMETHICONE 80 MG
80 TABLET,CHEWABLE ORAL
Status: DISCONTINUED | OUTPATIENT
Start: 2017-12-23 | End: 2017-12-25 | Stop reason: HOSPADM

## 2017-12-23 RX ADMIN — Medication 19980 MILLI-UNITS/HR: at 15:40

## 2017-12-23 RX ADMIN — Medication 2500 MILLI-UNITS/HR: at 16:04

## 2017-12-23 RX ADMIN — Medication 10 ML/HR: at 00:30

## 2017-12-23 RX ADMIN — Medication 10 ML/HR: at 09:19

## 2017-12-23 RX ADMIN — METHYLERGONOVINE MALEATE 0.2 MG: 0.2 INJECTION, SOLUTION INTRAMUSCULAR; INTRAVENOUS at 15:24

## 2017-12-23 RX ADMIN — SODIUM CHLORIDE, SODIUM LACTATE, POTASSIUM CHLORIDE, AND CALCIUM CHLORIDE 125 ML/HR: 600; 310; 30; 20 INJECTION, SOLUTION INTRAVENOUS at 02:43

## 2017-12-23 RX ADMIN — IBUPROFEN 800 MG: 800 TABLET ORAL at 17:40

## 2017-12-23 RX ADMIN — Medication 2 MILLI-UNITS/MIN: at 06:11

## 2017-12-23 NOTE — PROGRESS NOTES
Bedside shift change report given to Rick Shaw RN (oncoming nurse) by Kyle Alcala RN (offgoing nurse). Report included the following information SBAR, Kardex, Intake/Output and MAR.

## 2017-12-23 NOTE — PROGRESS NOTES
SBAR IN Report: Mother    Verbal report received from Alondra Amaya RN (full name & credentials) on this patient, who is now being transferred from  and D (unit) for routine progression of care. The patient is not wearing a green \"Anesthesia-Duramorph\" band. Report consisted of patient's Situation, Background, Assessment and Recommendations (SBAR).  ID bands were compared with the identification form, and verified with the patient and transferring nurse. Information from the SBAR, Kardex, Intake/Output and MAR and the [place] Report was reviewed with the transferring nurse; opportunity for questions and clarification provided.

## 2017-12-23 NOTE — L&D DELIVERY NOTE
Delivery Summary    Patient: Autumn Man MRN: [id number]  SSN: [id number]    YOB: 1995  Age: 20 y.o. Sex: female        Labor Events:    Labor: No    Rupture Date: 2017    Rupture Time: 11:19 AM    Rupture Type AROM    Amniotic Fluid Volume:      Amniotic Fluid Description: Clear  None    Induction: Jain Bulb (balloon); Oxytocin;AROM        Augmentation: None    Labor Complications: None     Additional Complications:        Cervical Ripening: Jain/EASI;None      Delivery Events:  Episiotomy: None    Laceration(s): Right labial;Left labial      Repaired: None     Number of Repair Packets:      Suture Type and Size:         Estimated Blood Loss (ml):   250  cc       Information for the patient's :  Shayy Casarez Female [[id number]]     Delivery Summary - Baby    Delivery Date: 2017   Delivery Time: 2:36 PM   Delivery Type: Vaginal, Spontaneous Delivery  Sex:  female  Gestational Age: 37w6d  Delivery Clinician:  Lili Cortes  Living?: Living   Delivery Location: L&D             APGARS  One minute Five minutes Ten minutes   Skin Color: 1    1       Heart Rate: 2   2         Reflex Irritability: 2   2         Muscle Tone: 2   2       Respiration: 2   2         Total: 9   9           Presentation: Vertex  Position:   Occiput    Resuscitation Method:  Suctioning-bulb; Tactile Stimulation     Meconium Stained: None    Cord Information: 3 Vessels   Complications: Other(Comment) (Comment)  Cord Blood Sent?:  No    Blood Gases Sent?:  No    Placenta:  Date/Time:   2:39 PM  Removal: Spontaneous      Appearance: Normal;Intact      Measurements:  Birth Weight:      Birth Length:     Head Circumference:       Chest Circumference:      Abdominal Girth:       Other Providers:   CRISELDA Sainz Dorene [place] Obstetrician;Primary Nurse;Primary  Nurse;Charge Nurse;Tech           Cord Blood Results:  Information for the patient's :  Daisy Dejesus, Female [[id number]]   No results found for: Agnes Bathe, PCTDIG, BILI, ABORHEXT, ABORH    Information for the patient's :  Daisy Dejesus, Female [[id number]]   No results found for: APH, APCO2, APO2, AHCO3, ABEC, ABDC, O2ST, SITE, [place], PHI, [place], PO2I, HCO3I, SO2I, IBD     Information for the patient's :  Daisy Dejesus, Female [[id number]]   No results found for: EPHV, PCO2V, PO2V, HCO3V, O2STV, EBDV

## 2017-12-23 NOTE — LACTATION NOTE
This note was copied from a baby's chart. Discussed with mother her plan for feeding. Reviewed the benefits of exclusive breast milk feeding during the hospital stay. Informed her of the risks of using formula to supplement in the first few days of life as well as the benefits of successful breast milk feeding; referred her to the Breastfeeding booklet about this information. She acknowledges understanding of information reviewed and states that it is her plan to breast feed her infant. Will support her choice and offer additional information as needed. Pt will successfully establish breastfeeding by feeding in response to early feeding cues   or wake every 3h, will obtain deep latch, and will keep log of feedings/output. Taught to BF at hunger cues and or q 2-3 hrs and to offer 10-20 drops of hand expressed colostrum at any non-feeds. Breast Assessment  Left Breast: Medium  Left Nipple: Everted, Intact  Right Breast: Medium  Right Nipple: Everted, Intact  Breast- Feeding Assessment  Attends Breast-Feeding Classes: Yes  Breast-Feeding Experience: No  Breast Trauma/Surgery: No  Type/Quality: Good  Lactation Consultant Visits  Breast-Feedings: Good   Mother/Infant Observation  Mother Observation: Alignment, Lets baby end feeding, Nipple round on release, Recognizes feeding cues  LATCH Documentation  Latch: Repeated attempts, hold nipple in mouth, stimulate to suck  Audible Swallowing: A few with stimulation  Type of Nipple: Everted (after stimulation)  Comfort (Breast/Nipple): Soft/non-tender  Hold (Positioning): No assist from staff, mother able to position/hold infant  LATCH Score: 8  Reviewed breastfeeding basics:  How milk is made and normal  breastfeeding behaviors discussed. Supply and demand,  stomach size, early feeding cues, skin to skin bonding with comfortable positioning and baby led latch-on reviewed.   How to identify signs of successful breastfeeding sessions reviewed; education on assymetrical latch, signs of effective latching vs shallow, in-effective latching, normal  feeding frequency and duration and expected infant output discussed. Normal course of breastfeeding discussed including the AAP's recommendation that children receive exclusive breast milk feedings for the first six months of life with breast milk feedings to continue through the first year of life and/or beyond as complimentary table foods are added. Breastfeeding Booklet and Warm line information provided with discussion. Discussed typical  weight loss and the importance of pediatrician appointment within 24-48 hours of discharge, at 2 weeks of life and normalcy of requesting pediatric weight checks as needed in between visits.

## 2017-12-23 NOTE — PROGRESS NOTES
Labor Progress Note  Patient seen, fetal heart rate and contraction pattern evaluated, patient examined. Visit Vitals    /89    Pulse 77    Temp 98.2 °F (36.8 °C)    Resp 18    Ht 5' 9.5\" (1.765 m)    Wt 84.8 kg (187 lb)    SpO2 94%    BMI 27.22 kg/m2       Physical Exam:  Cervical Exam:  5 cm dilated    Membranes:  Artificial Rupture of Membranes;  Amniotic Fluid: medium amount of clear fluid  Uterine Activity: Frequency: Every 2-3 minutes  Fetal Heart Rate: Reactive    Assessment/Plan:  Labor  Continue expectant management, Continue plan for vaginal delivery

## 2017-12-23 NOTE — PROGRESS NOTES
Patient s/p stanton bulb  Comfortable with epidural  FHr 130s cat I tracing without decels ctx q 3 4 minutes  Monitor

## 2017-12-23 NOTE — PROGRESS NOTES
0700:  Received report from Teresa Mcclain RN, assumed care at this time. 1000:  Spoke with Viri MOTTA at this time to determine if she is managing the pts care. Per Barb Rangel MD she has not received a sign out on this pt. Nurse verbalized understanding and will call the on call MD.    1015:  Nurse called [facility] after hours line. Nurse informed the  that the nurse did not have any contact information on Dr. Collette Rocha who is listed as the on call MD.  The  informed me that Dr. Collette Rocha is the second on call and that the office nurse is the first to be called and that she would inform the on call nurse with the nurses needed. Informed her that I needed to know who was managing this patients care.  stated that she would have the on call nurse contact the unit. Nurse verbalized understanding. 1020:  Nurse spoke with office nurse at this time, nurse asked for contact information for Dr. Collette Rocha who is the MD on call to determine a plan of care for the pt. Nurse was informed by on call nurse that this practice has signed out the hospitalist.  The nurse repeated again that the hospitalist covers for this practice on weekends. Nurse will inform Barb Rangel MD of the phone call. 1515:  Ethan Perry MD at this time to inform her of the pts bleeding of moderate with clots and boggy with the last two fundal checks. Per MD she would like for the nurse to give the pt methergine IM once, nurse discussed pts blood pressures with MD.  Per MD she confirmed that it is fine to give the pt methergine. Nurse verbalized understanding.       1755:  Reported off to Alban Rivas RN

## 2017-12-23 NOTE — PROGRESS NOTES
[id number]: Bedside and Verbal shift change report given to JUANY Gonzalez RN (oncoming nurse) by EDWIN Self RN (offgoing nurse). Report included the following information SBAR, Kardex, Intake/Output, MAR, Accordion, Recent Results and Med Rec Status. Assumed care of pt at this time. Pt denies HA, visual disturbances, RUQ pain. Gentle tension placed on stanton bulb catheter. Pt turned to left lateral with support pillow behind back and between knees. Pt able to turn without assistance. Pt gives birth plan to RN - RN to review plan. RN reminds pt that she is unable to get out of bed at this time d/t epidural. Yellow fall risk band placed on pt. Pt denies further need of anything at this time. 2000: Purposeful rounding performed on pt. Pt currently resting quietly with FOB at bedside. Pt denies need of anything at this time. 2045: RN at bedside for purposeful rounding. Pt turned to right lateral with support pillow at back, between knees, and under abdomen. Pt states she can feel tightening and cramping during ctx - rated 4/10 on pain scale. Pt states this is tolerable. RN explain to pt that if the pain become intolerable to notify RN. Pt verbalizes understanding and agrees. RN and pt discuss birth plan including: delayed cord clamping, skin to skin immediately after delivery, delayed abx and vit k injection until after magic hour. Pt continues to have adequate output and denies HA, visual disturbances, RUQ pain. Gentle tension placed on stanton bulb. Pt denies need of anything else at this time. 2150: Purposeful rounding. Pt continues to rate ctx's at 4/10. Feeling the ctx in abd and back. Bolus button offered, but pt declines at this time. Pt denies need of anything else at this time. 9731-8938: RN at bedside to purposeful rounding. Pt turned to lateral left with support pillow at back, between knees and under abdomen. Pt continues to feel ctx in abd and back, but states that she is ready for the bolus button.  RN offers the bolus button. Pain reassessed at 0/10. Pt continues to have adequate urinary output. Gentle tension placed on stanton bulb - stanton bulb out. Pt continues to ctx on own. RN advises pt to notify her if: ROM, pain intensifies, rectal or vaginal pressure felt. Pt verbalizes understanding and agrees. 5075-0384: RN at bedside for purposeful rounding. Pt c/o vaginal itches. RN performs stanton & perineal care using stanton care wipes and baby wipes. After cleaning, pt states she no longer feels itching & discomfort. Pt continues to have adequate output. Denies HA, visual disturbances, RUQ pain. Pt denies need of anything else at this time. 0030: RN at bedside to replace epidural bag and perform purposeful rounding. Pt denies need of anything at this time. 3684-0949: RN at bedside for purposeful rounding. Pt turned to right lateral with support pillow at back, between knees, under abdomen. Pt asks when her cervix will be checked again. RN states that there is no need to check her cervix at this time and will likely not be checked unless she feels pressure or ROM, as each time her cervix is checked, bacteria is introduced into the vaginal canal and increases risk for infxn. Pt verbalizes understanding and agrees. Pt denies need of anything else at this time. 12: RN at bedside for purposeful rounding. Pt denies need of anything at this time. 0330: RN at bedside for purposeful rounding. Reassessment performed. Pt continues to deny HA, visual disturbances, RUQ pain. Pt resting quietly at this time. Pt denies need of anything at this time. 5858: Pt calls to RN station stating she is ready to turn. RN at bedside to assist pt into left lateral position with support pillows at back, between knees, and under abdomen. Pt states that she was seeing \"light spots\" for a few seconds, but denies HA, RUQ pain. RN takes pt's blood pressure 139/84. Pt states she is no longer seeing spots.  Pt denies need of anything else at this time. 7742: Purposeful rounding performed. Pt observed resting quietly with FOB sleeping at bedside. Pt denies need of anything at this time. 8514: MD at nurses station to assess strip. Per MD, begin oxytocin at 2mu/min and increase by 2mu/min. Tawanda Cough.  0675: RN at bedside to begin oxytocin. RN explains that the oxytocin will be titrated every 20 minutes at 2mu/min increases, beginning at 2mu/min. Pt asks if this will \"help the ctx come faster and stronger. \" RN explains that it will bring the ctx closer together and can make the ctx feel more intense. RN repeats that the medication will be titrated slowly. Pt verbalizes understanding and agrees. Oxytocin began at 8070.   3509: RN at bedside to increase oxytocin to 4mu/min. Pt denies need of anything at this time. 4263: Bedside and Verbal shift change report given to JUANY Coe RN (oncoming nurse) by JUANY Moreno RN (offgoing nurse). Report included the following information SBAR, Kardex, Intake/Output, MAR, Accordion, Recent Results and Med Rec Status.

## 2017-12-24 PROCEDURE — 74011250637 HC RX REV CODE- 250/637: Performed by: OBSTETRICS & GYNECOLOGY

## 2017-12-24 PROCEDURE — 65270000029 HC RM PRIVATE

## 2017-12-24 RX ADMIN — IBUPROFEN 800 MG: 800 TABLET ORAL at 21:25

## 2017-12-24 RX ADMIN — IBUPROFEN 800 MG: 800 TABLET ORAL at 03:45

## 2017-12-24 RX ADMIN — IBUPROFEN 800 MG: 800 TABLET ORAL at 12:38

## 2017-12-24 NOTE — PROGRESS NOTES
1135 pt is sitting in the bed and she is resting  1230 Pt is sitting in the bed and she is going to eat lunch  1316 Pt is sitting in the bed and she is holding the baby  1444 Pt is laying in the bed and she is sleeping  1629 Pt is sitting in the bed and she is relaxing  1724 Pt is sitting in the bed and she is holding the baby  1818 Pt is sitting in the bed and she is watching TV

## 2017-12-24 NOTE — PROGRESS NOTES
Post-Partum Day Number 1 Progress Note    Josr Latin     Assessment: Doing well, post partum day 1.  after IOL for preeclampsia without severe features at term. Plan:  1. Continue routine postpartum and perineal care as well as maternal education. 2. Will monitor blood pressure. Information for the patient's :  Rolo Mcallister, Female [[id number]]   Vaginal, Spontaneous Delivery     Patient doing well without significant complaint. Voiding without difficulty, normal lochia. Tolerating a diet, ambulating. Breastfeeding. BPs initially high-mild after delivery, but now 140/80. Vitals:  Visit Vitals    /80 (BP 1 Location: Left arm, BP Patient Position: At rest)    Pulse 82    Temp 98.6 °F (37 °C)    Resp 16    Ht 5' 9.5\" (1.765 m)    Wt 84.8 kg (187 lb)    SpO2 94%    BMI 27.22 kg/m2     Temp (24hrs), Av.6 °F (37 °C), Min:98.6 °F (37 °C), Max:98.6 °F (37 °C)        Exam:   Patient without distress. Abdomen soft, fundus firm, nontender                Perineum with normal lochia noted. Lower extremities are negative for swelling, cords or tenderness. Labs:     Lab Results   Component Value Date/Time    WBC 12.0 2017 11:03 AM    WBC 9.3 2016 11:00 AM    HGB 11.7 2017 11:03 AM    HGB 10.7 2016 11:00 AM    HCT 35.5 2017 11:03 AM    HCT 34.2 2016 11:00 AM    PLATELET 140  11:03 AM    PLATELET 463  11:00 AM    Hgb, External 9.9 2017    Hct, External 30.7 2017    Platelet cnt., External 229 2017       No results found for this or any previous visit (from the past 24 hour(s)).

## 2017-12-25 VITALS
HEART RATE: 74 BPM | HEIGHT: 70 IN | WEIGHT: 187 LBS | BODY MASS INDEX: 26.77 KG/M2 | DIASTOLIC BLOOD PRESSURE: 85 MMHG | OXYGEN SATURATION: 94 % | RESPIRATION RATE: 18 BRPM | SYSTOLIC BLOOD PRESSURE: 138 MMHG | TEMPERATURE: 98.5 F

## 2017-12-25 PROCEDURE — 74011250637 HC RX REV CODE- 250/637: Performed by: OBSTETRICS & GYNECOLOGY

## 2017-12-25 RX ORDER — IBUPROFEN 800 MG/1
800 TABLET ORAL
Qty: 30 TAB | Refills: 1 | Status: SHIPPED | OUTPATIENT
Start: 2017-12-25

## 2017-12-25 RX ORDER — OXYCODONE AND ACETAMINOPHEN 5; 325 MG/1; MG/1
1 TABLET ORAL
Qty: 15 TAB | Refills: 0 | Status: SHIPPED | OUTPATIENT
Start: 2017-12-25

## 2017-12-25 RX ORDER — DOCUSATE SODIUM 100 MG/1
100 CAPSULE, LIQUID FILLED ORAL 2 TIMES DAILY
Qty: 60 CAP | Refills: 0 | Status: SHIPPED | OUTPATIENT
Start: 2017-12-25 | End: 2018-01-24

## 2017-12-25 RX ORDER — ESCITALOPRAM OXALATE 10 MG/1
10 TABLET ORAL DAILY
Qty: 30 TAB | Refills: 1 | OUTPATIENT
Start: 2017-12-25 | End: 2021-11-09

## 2017-12-25 RX ADMIN — IBUPROFEN 800 MG: 800 TABLET ORAL at 09:28

## 2017-12-25 NOTE — PROGRESS NOTES
Post-Partum Day Number 2 Progress Note    Negrita Marinelli     Assessment: Doing well, post partum day 2    Post partum anxiety with history of anxiety    Plan:   1. Discharge home today  2. Follow up in office in 6 weeks with Darlin Rowe MD  3. Post partum activity advised, diet as tolerated  4. Discharge Medications: ibuprofen, percocet and medications prior to admission as well as re initiation of lexapro 10 mg    Information for the patient's :  Romana Pimple, Female [[id number]]   Vaginal, Spontaneous Delivery   Patient doing well without significant complaint. Voiding without difficulty, normal lochia. Denies SI/HI    Vitals:  Visit Vitals    /85 (BP 1 Location: Left arm, BP Patient Position: At rest)    Pulse 74    Temp 98.5 °F (36.9 °C)    Resp 18    Ht 5' 9.5\" (1.765 m)    Wt 84.8 kg (187 lb)    SpO2 94%    Breastfeeding Unknown    BMI 27.22 kg/m2     Temp (24hrs), Av.4 °F (36.9 °C), Min:98.4 °F (36.9 °C), Max:98.5 °F (36.9 °C)      Exam:         Patient without distress. Abdomen soft, fundus firm, nontender                 Lower extremities are negative for swelling, cords or tenderness. Labs:     Lab Results   Component Value Date/Time    WBC 12.0 2017 11:03 AM    WBC 9.3 2016 11:00 AM    HGB 11.7 2017 11:03 AM    HGB 10.7 2016 11:00 AM    HCT 35.5 2017 11:03 AM    HCT 34.2 2016 11:00 AM    PLATELET 450  11:03 AM    PLATELET 620  11:00 AM    Hgb, External 9.9 2017    Hct, External 30.7 2017    Platelet cnt., External 229 2017       No results found for this or any previous visit (from the past 24 hour(s)).

## 2017-12-25 NOTE — ROUTINE PROCESS
Discharge instructions given to patient including but not limited to signs and symptoms and who to call; restrictions and limitations; postpartum depression. Prescriptions given to patient with instructions (#4). Discharge paperwork signed in computer. All questions answered.

## 2017-12-25 NOTE — PROGRESS NOTES
Bedside and Verbal shift change report given to Jose Daniel Newby RN (oncoming nurse) by Del De La Torre RNC (offgoing nurse). Report included the following information SBAR, Kardex and MAR.

## 2017-12-25 NOTE — DISCHARGE SUMMARY
Obstetrical Discharge Summary     Name: Merline Cruel MRN: [id number]  SSN: [id number]    YOB: 1995  Age: 20 y.o. Sex: female      Admit Date: 2017    Discharge Date: 2017     Admitting Physician: Claritza Sadler MD     Attending Physician:  Bibi William MD     Admission Diagnoses: Pregnancy  Proteinuria affecting pregnancy  Normal labor and delivery    Discharge Diagnoses:   Information for the patient's :  Joe Mares Female [[id number]]   Delivery of a 3.245 kg female infant via Vaginal, Spontaneous Delivery on 2017 at 2:36 PM  by . Apgars were 9 and 9. Additional Diagnoses:   Hospital Problems  Never Reviewed          Codes Class Noted POA    Proteinuria affecting pregnancy ICD-10-CM: O12.10  ICD-9-CM: 646.20, 791.0  2017 Unknown        Normal labor and delivery ICD-10-CM: O80  ICD-9-CM: 459  2017 Unknown             Lab Results   Component Value Date/Time    Rubella, External Immune 2017    GrBStrep, External Negative 2017       Hospital Course: Postpartum course was complicated by anxiety and mood changes no no severe, which added 0 days to the patient's length of stay. Disposition at Discharge: Home or self care    Discharged Condition: Stable    Patient Instructions:   Current Discharge Medication List      START taking these medications    Details   ibuprofen (MOTRIN) 800 mg tablet Take 1 Tab by mouth every eight (8) hours as needed for Pain. Qty: 30 Tab, Refills: 1      oxyCODONE-acetaminophen (PERCOCET) 5-325 mg per tablet Take 1 Tab by mouth every four (4) hours as needed. Max Daily Amount: 6 Tabs. Qty: 15 Tab, Refills: 0    Associated Diagnoses: Normal labor and delivery      docusate sodium (COLACE) 100 mg capsule Take 1 Cap by mouth two (2) times a day for 30 days. Qty: 60 Cap, Refills: 0      escitalopram oxalate (LEXAPRO) 10 mg tablet Take 1 Tab by mouth daily.   Qty: 30 Tab, Refills: 1         CONTINUE these medications which have NOT CHANGED    Details   [id number] jake-folic-dha (PRENATAL DHA+COMPLETE PRENATAL) -300 mg-mcg-mg cmpk Take  by mouth. STOP taking these medications       HYDROcodone-acetaminophen (NORCO) 5-325 mg per tablet Comments:   Reason for Stopping:         ondansetron (ZOFRAN ODT) 4 mg disintegrating tablet Comments:   Reason for Stopping:               Reference my discharge instructions.     Follow-up Appointments   Procedures    FOLLOW UP VISIT Appointment in: [place]     Standing Status:   Standing     Number of Occurrences:   1     Order Specific Question:   Appointment in     Answer:   6 Weeks        Signed By:  Davonte Ramos MD     December 25, 2017

## 2017-12-25 NOTE — DISCHARGE INSTRUCTIONS
Vaginal Childbirth: Care Instructions  Your Care Instructions    Your body will slowly heal in the next few weeks. It is easy to get too tired and overwhelmed during the first weeks after your baby is born. Changes in your hormones can shift your mood without warning. You may find it hard to meet the extra demands on your energy and time. Take it easy on yourself. Follow-up care is a key part of your treatment and safety. Be sure to make and go to all appointments, and call your doctor if you are having problems. It's also a good idea to know your test results and keep a list of the medicines you take. How can you care for yourself at home? · Vaginal bleeding and cramps  ¨ After delivery, you will have a bloody discharge from the vagina. This will turn pink within a week and then white or yellow after about 10 days. It may last for 2 to 4 weeks or longer, until the uterus has healed. Use pads instead of tampons until you stop bleeding. ¨ Do not worry if you pass some blood clots, as long as they are smaller than a golf ball. If you have a tear or stitches in your vaginal area, change the pad at least every 4 hours to prevent soreness and infection. ¨ You may have cramps for the first few days after childbirth. These are normal and occur as the uterus shrinks to normal size. Take an over-the-counter pain medicine, such as acetaminophen (Tylenol), ibuprofen (Advil, Motrin), or naproxen (Aleve), for cramps. Read and follow all instructions on the label. Do not take aspirin, because it can cause more bleeding. ¨ Do not take two or more pain medicines at the same time unless the doctor told you to. Many pain medicines have acetaminophen, which is Tylenol. Too much acetaminophen (Tylenol) can be harmful. · Stitches  ¨ If you have stitches, they will dissolve on their own and do not need to be removed. Follow your doctor's instructions for cleaning the stitched area.   ¨ Put ice or a cold pack on your painful area for 10 to 20 minutes at a time, several times a day, for the first few days. Put a thin cloth between the ice and your skin. ¨ Sit in a few inches of warm water (sitz bath) 3 times a day and after bowel movements. The warm water helps with pain and itching. If you do not have a tub, a warm shower might help. · Breast fullness  ¨ Your breasts may overfill (engorge) in the first few days after delivery. To help milk flow and to relieve pain, warm your breasts in the shower or by using warm, moist towels before nursing. ¨ If you are not nursing, do not put warmth on your breasts or touch your breasts. Wear a tight bra or sports bra and use ice until the fullness goes away. This usually takes 2 to 3 days. ¨ Put ice or a cold pack on your breast after nursing to reduce swelling and pain. Put a thin cloth between the ice and your skin. · Activity  ¨ Eat a balanced diet. Do not try to lose weight by cutting calories. Keep taking your prenatal vitamins, or take a multivitamin. ¨ Get as much rest as you can. Try to take naps when your baby sleeps during the day. ¨ Get some exercise every day. But do not do any heavy exercise until your doctor says it is okay. ¨ Wait until you are healed (about 4 to 6 weeks) before you have sexual intercourse. Your doctor will tell you when it is okay to have sex. ¨ Talk to your doctor about birth control. You can get pregnant even before your period returns. Also, you can get pregnant while you are breastfeeding. · Mental health  ¨ It is normal to have some sadness, anxiety, sleeplessness, and mood swings after you go home. If you feel upset or hopeless for more than a few days or are having trouble doing the things you need to do, talk to your doctor. · Constipation and hemorrhoids  ¨ Drink plenty of fluids, enough so that your urine is light yellow or clear like water.  If you have kidney, heart, or liver disease and have to limit fluids, talk with your doctor before you increase the amount of fluids you drink. ¨ Eat plenty of fiber each day. Have a bran muffin or bran cereal for breakfast, and try eating a piece of fruit for a mid-afternoon snack. ¨ For painful, itchy hemorrhoids, put ice or a cold pack on the area several times a day for 10 minutes at a time. Follow this by putting a warm compress on the area for another 10 to 20 minutes or by sitting in a shallow, warm bath. When should you call for help? Call 911 anytime you think you may need emergency care. For example, call if:  ? · You passed out (lost consciousness). ?Call your doctor now or seek immediate medical care if:  ? · You have severe vaginal bleeding. ? · You are dizzy or lightheaded, or you feel like you may faint. ? · You have a fever. ? · You have new or more pain in your belly or pelvis. ? Watch closely for changes in your health, and be sure to contact your doctor if:  ? · Your vaginal bleeding seems to be getting heavier. ? · You have new or worse vaginal discharge. ? · You feel sad, anxious, or hopeless for more than a few days. ? · You do not get better as expected. Where can you learn more? Go to http://musa-priscilla.info/. Enter Q412 in the search box to learn more about \"Vaginal Childbirth: Care Instructions. \"  Current as of: March 16, 2017  Content Version: 11.4  © 5481-3130 [employer]. Care instructions adapted under license by [employer] (which disclaims liability or warranty for this information). If you have questions about a medical condition or this instruction, always ask your healthcare professional. [place] [address] any warranty or liability for your use of this information.

## 2017-12-25 NOTE — ROUTINE PROCESS
Bedside and Verbal shift change report given to A. Jenetta Mortimer, RN (oncoming nurse) by EDWIN Ashby RN (offgoing nurse). Report included the following information SBAR, Kardex and MAR.

## 2021-11-09 ENCOUNTER — APPOINTMENT (OUTPATIENT)
Dept: CT IMAGING | Age: 26
End: 2021-11-09
Attending: EMERGENCY MEDICINE
Payer: COMMERCIAL

## 2021-11-09 ENCOUNTER — HOSPITAL ENCOUNTER (EMERGENCY)
Age: 26
Discharge: HOME | End: 2021-11-09
Attending: EMERGENCY MEDICINE
Payer: COMMERCIAL

## 2021-11-09 VITALS
HEIGHT: 69 IN | OXYGEN SATURATION: 100 % | HEART RATE: 88 BPM | RESPIRATION RATE: 18 BRPM | TEMPERATURE: 97 F | SYSTOLIC BLOOD PRESSURE: 145 MMHG | BODY MASS INDEX: 27.62 KG/M2 | DIASTOLIC BLOOD PRESSURE: 90 MMHG

## 2021-11-09 DIAGNOSIS — R51.9 ACUTE NONINTRACTABLE HEADACHE, UNSPECIFIED HEADACHE TYPE: ICD-10-CM

## 2021-11-09 DIAGNOSIS — T50.905A ADVERSE EFFECT OF DRUG, INITIAL ENCOUNTER: Primary | ICD-10-CM

## 2021-11-09 LAB
ALBUMIN SERPL-MCNC: 4.4 G/DL (ref 3.5–5)
ALBUMIN/GLOB SERPL: 1.2 {RATIO} (ref 1.1–2.2)
ALP SERPL-CCNC: 82 U/L (ref 45–117)
ALT SERPL-CCNC: 14 U/L (ref 12–78)
AMPHET UR QL SCN: NEGATIVE
ANION GAP SERPL CALC-SCNC: 6 MMOL/L (ref 5–15)
APPEARANCE UR: ABNORMAL
AST SERPL-CCNC: 13 U/L (ref 15–37)
BACTERIA URNS QL MICRO: ABNORMAL /HPF
BARBITURATES UR QL SCN: NEGATIVE
BASOPHILS # BLD: 0 K/UL (ref 0–0.1)
BASOPHILS NFR BLD: 0 % (ref 0–1)
BENZODIAZ UR QL: NEGATIVE
BILIRUB SERPL-MCNC: 0.3 MG/DL (ref 0.2–1)
BILIRUB UR QL: NEGATIVE
BUN SERPL-MCNC: 9 MG/DL (ref 6–20)
BUN/CREAT SERPL: 12 (ref 12–20)
CALCIUM SERPL-MCNC: 9.3 MG/DL (ref 8.5–10.1)
CANNABINOIDS UR QL SCN: NEGATIVE
CHLORIDE SERPL-SCNC: 109 MMOL/L (ref 97–108)
CO2 SERPL-SCNC: 24 MMOL/L (ref 21–32)
COCAINE UR QL SCN: NEGATIVE
COLOR UR: ABNORMAL
COMMENT, HOLDF: NORMAL
CREAT SERPL-MCNC: 0.77 MG/DL (ref 0.55–1.02)
DIFFERENTIAL METHOD BLD: ABNORMAL
DRUG SCRN COMMENT,DRGCM: NORMAL
EOSINOPHIL # BLD: 0 K/UL (ref 0–0.4)
EOSINOPHIL NFR BLD: 0 % (ref 0–7)
EPITH CASTS URNS QL MICRO: ABNORMAL /LPF
ERYTHROCYTE [DISTWIDTH] IN BLOOD BY AUTOMATED COUNT: 13.6 % (ref 11.5–14.5)
ETHANOL SERPL-MCNC: <10 MG/DL
GLOBULIN SER CALC-MCNC: 3.7 G/DL (ref 2–4)
GLUCOSE BLD STRIP.AUTO-MCNC: 103 MG/DL (ref 65–117)
GLUCOSE SERPL-MCNC: 91 MG/DL (ref 65–100)
GLUCOSE UR STRIP.AUTO-MCNC: NEGATIVE MG/DL
HCG UR QL: NEGATIVE
HCT VFR BLD AUTO: 38.6 % (ref 35–47)
HGB BLD-MCNC: 12.1 G/DL (ref 11.5–16)
HGB UR QL STRIP: ABNORMAL
IMM GRANULOCYTES # BLD AUTO: 0 K/UL (ref 0–0.04)
IMM GRANULOCYTES NFR BLD AUTO: 0 % (ref 0–0.5)
KETONES UR QL STRIP.AUTO: ABNORMAL MG/DL
LEUKOCYTE ESTERASE UR QL STRIP.AUTO: ABNORMAL
LYMPHOCYTES # BLD: 1.4 K/UL (ref 0.8–3.5)
LYMPHOCYTES NFR BLD: 18 % (ref 12–49)
MAGNESIUM SERPL-MCNC: 2.1 MG/DL (ref 1.6–2.4)
MCH RBC QN AUTO: 24.9 PG (ref 26–34)
MCHC RBC AUTO-ENTMCNC: 31.3 G/DL (ref 30–36.5)
MCV RBC AUTO: 79.6 FL (ref 80–99)
METHADONE UR QL: NEGATIVE
MONOCYTES # BLD: 0.6 K/UL (ref 0–1)
MONOCYTES NFR BLD: 8 % (ref 5–13)
NEUTS SEG # BLD: 5.6 K/UL (ref 1.8–8)
NEUTS SEG NFR BLD: 74 % (ref 32–75)
NITRITE UR QL STRIP.AUTO: NEGATIVE
NRBC # BLD: 0 K/UL (ref 0–0.01)
NRBC BLD-RTO: 0 PER 100 WBC
OPIATES UR QL: NEGATIVE
PCP UR QL: NEGATIVE
PH UR STRIP: 6 [PH] (ref 5–8)
PLATELET # BLD AUTO: 270 K/UL (ref 150–400)
PMV BLD AUTO: 9.9 FL (ref 8.9–12.9)
POTASSIUM SERPL-SCNC: 4 MMOL/L (ref 3.5–5.1)
PROT SERPL-MCNC: 8.1 G/DL (ref 6.4–8.2)
PROT UR STRIP-MCNC: ABNORMAL MG/DL
RBC # BLD AUTO: 4.85 M/UL (ref 3.8–5.2)
RBC #/AREA URNS HPF: ABNORMAL /HPF (ref 0–5)
SAMPLES BEING HELD,HOLD: NORMAL
SERVICE CMNT-IMP: NORMAL
SODIUM SERPL-SCNC: 139 MMOL/L (ref 136–145)
SP GR UR REFRACTOMETRY: 1.02 (ref 1–1.03)
TROPONIN-HIGH SENSITIVITY: 4 NG/L (ref 0–51)
UR CULT HOLD, URHOLD: NORMAL
UROBILINOGEN UR QL STRIP.AUTO: 0.2 EU/DL (ref 0.2–1)
WBC # BLD AUTO: 7.6 K/UL (ref 3.6–11)
WBC URNS QL MICRO: ABNORMAL /HPF (ref 0–4)

## 2021-11-09 PROCEDURE — 81025 URINE PREGNANCY TEST: CPT

## 2021-11-09 PROCEDURE — 84484 ASSAY OF TROPONIN QUANT: CPT

## 2021-11-09 PROCEDURE — 96374 THER/PROPH/DIAG INJ IV PUSH: CPT

## 2021-11-09 PROCEDURE — 80053 COMPREHEN METABOLIC PANEL: CPT

## 2021-11-09 PROCEDURE — 85025 COMPLETE CBC W/AUTO DIFF WBC: CPT

## 2021-11-09 PROCEDURE — 81001 URINALYSIS AUTO W/SCOPE: CPT

## 2021-11-09 PROCEDURE — 99283 EMERGENCY DEPT VISIT LOW MDM: CPT

## 2021-11-09 PROCEDURE — 93005 ELECTROCARDIOGRAM TRACING: CPT

## 2021-11-09 PROCEDURE — 82077 ASSAY SPEC XCP UR&BREATH IA: CPT

## 2021-11-09 PROCEDURE — 36415 COLL VENOUS BLD VENIPUNCTURE: CPT

## 2021-11-09 PROCEDURE — 82962 GLUCOSE BLOOD TEST: CPT

## 2021-11-09 PROCEDURE — 74011250636 HC RX REV CODE- 250/636: Performed by: EMERGENCY MEDICINE

## 2021-11-09 PROCEDURE — 96375 TX/PRO/DX INJ NEW DRUG ADDON: CPT

## 2021-11-09 PROCEDURE — 80307 DRUG TEST PRSMV CHEM ANLYZR: CPT

## 2021-11-09 PROCEDURE — 83735 ASSAY OF MAGNESIUM: CPT

## 2021-11-09 PROCEDURE — 70450 CT HEAD/BRAIN W/O DYE: CPT

## 2021-11-09 RX ORDER — DIPHENHYDRAMINE HYDROCHLORIDE 50 MG/ML
12.5 INJECTION, SOLUTION INTRAMUSCULAR; INTRAVENOUS ONCE
Status: COMPLETED | OUTPATIENT
Start: 2021-11-09 | End: 2021-11-09

## 2021-11-09 RX ORDER — PROCHLORPERAZINE EDISYLATE 5 MG/ML
10 INJECTION INTRAMUSCULAR; INTRAVENOUS
Status: COMPLETED | OUTPATIENT
Start: 2021-11-09 | End: 2021-11-09

## 2021-11-09 RX ADMIN — SODIUM CHLORIDE 1000 ML: 9 INJECTION, SOLUTION INTRAVENOUS at 19:04

## 2021-11-09 RX ADMIN — PROCHLORPERAZINE EDISYLATE 10 MG: 5 INJECTION INTRAMUSCULAR; INTRAVENOUS at 19:06

## 2021-11-09 RX ADMIN — DIPHENHYDRAMINE HYDROCHLORIDE 12.5 MG: 50 INJECTION, SOLUTION INTRAMUSCULAR; INTRAVENOUS at 19:04

## 2021-11-09 NOTE — ED PROVIDER NOTES
Kwaku Shaffer is a 32 yo F with frontal headache, palpitations and foggy feeling after starting Lexapro. She states she had taken it in the past, several years ago and did not have problems. She took her first dose this afternoon around 2:30pm and then about 30 minutes later she developed a headache and started to feel anxious and then felt numb all over with foggy feeling in her head and inablity to focus. Past Medical History:   Diagnosis Date    Anemia     Essential hypertension     Gestational hypertension     Infertility, female     Psychiatric disorder     panic attacks       No past surgical history on file. No family history on file. Social History     Socioeconomic History    Marital status:      Spouse name: Not on file    Number of children: Not on file    Years of education: Not on file    Highest education level: Not on file   Occupational History    Not on file   Tobacco Use    Smoking status: Never Smoker    Smokeless tobacco: Never Used   Substance and Sexual Activity    Alcohol use: No    Drug use: No    Sexual activity: Yes   Other Topics Concern    Not on file   Social History Narrative    Not on file     Social Determinants of Health     Financial Resource Strain:     Difficulty of Paying Living Expenses: Not on file   Food Insecurity:     Worried About Running Out of Food in the Last Year: Not on file    [place]raine of Food in the Last Year: Not on file   Transportation Needs:     Lack of Transportation (Medical): Not on file    Lack of Transportation (Non-Medical):  Not on file   Physical Activity:     Days of Exercise per Week: Not on file    Minutes of Exercise per Session: Not on file   Stress:     Feeling of Stress : Not on file   Social Connections:     Frequency of Communication with Friends and Family: Not on file    Frequency of Social Gatherings with Friends and Family: Not on file    Attends [employer] Services: Not on file   Shane Sr Member of Clubs or Organizations: Not on file    Attends Club or Organization Meetings: Not on file    Marital Status: Not on file   Intimate Partner Violence:     Fear of Current or Ex-Partner: Not on file    Emotionally Abused: Not on file    Physically Abused: Not on file    Sexually Abused: Not on file   Housing Stability:     Unable to Pay for Housing in the Last Year: Not on file    Number of [place] in the Last Year: Not on file    Unstable Housing in the Last Year: Not on file         ALLERGIES: Patient has no known allergies. Review of Systems   Constitutional: Negative for fever. HENT: Negative for sore throat. Eyes: Negative for visual disturbance. Respiratory: Negative for cough. Cardiovascular: Positive for palpitations. Negative for chest pain. Gastrointestinal: Negative for abdominal pain. Genitourinary: Negative for dysuria. Musculoskeletal: Negative for back pain. Skin: Negative for rash. Neurological: Negative for headaches. Psychiatric/Behavioral: Positive for decreased concentration and dysphoric mood. Negative for suicidal ideas. The patient is nervous/anxious. Vitals:    11/09/21 1758   BP: (!) 145/90   Pulse: 88   Resp: 18   Temp: 97 °F (36.1 °C)   SpO2: 100%   Height: 5' 9\" (1.753 m)            Physical Exam  Vitals and nursing note reviewed. Constitutional:       General: She is not in acute distress. Appearance: She is well-developed. HENT:      Head: Normocephalic and atraumatic. Eyes:      Conjunctiva/sclera: Conjunctivae normal.   Neck:      Trachea: Phonation normal.   Cardiovascular:      Rate and Rhythm: Normal rate. Pulmonary:      Effort: Pulmonary effort is normal. No respiratory distress. Breath sounds: No wheezing or rales. Abdominal:      General: There is no distension. Musculoskeletal:         General: No tenderness. Normal range of motion. Cervical back: Normal range of motion.    Skin:     General: Skin is warm and dry. Neurological:      Mental Status: She is alert. She is not disoriented. Motor: No abnormal muscle tone. ED EKG interpretation:  Rhythm: normal sinus rhythm; and regular . Rate (approx.): 63; Axis: normal; P wave: normal; QRS interval: normal ; ST/T wave: normal; Other findings: normal. This EKG was interpreted by Thayne Mohs, MD,ED Provider. MDM         8:42 PM  Patient reassessed and states that she is feeling much better after compazine and benadryl. CT head normal as well as HS trop, CBC and CMP and magnsium. Will discharge home.   Advised patient to discontinue lexapro and follow-up with her physician to prescribe alternative medications      Procedures

## 2021-11-09 NOTE — ED TRIAGE NOTES
Pt arrives to ED with complaints of \"numbness, foggy feeling, unable to focus\" since starting new perscription lexapro 10mg today 3 hours ago. Pt reports about 30 minutes after taking she had a panic attack. PT reports she has taken this medication 4 years ago with no issues.

## 2021-11-12 LAB
ATRIAL RATE: 63 BPM
CALCULATED P AXIS, ECG09: 75 DEGREES
CALCULATED R AXIS, ECG10: 24 DEGREES
CALCULATED T AXIS, ECG11: 68 DEGREES
DIAGNOSIS, 93000: NORMAL
P-R INTERVAL, ECG05: 140 MS
Q-T INTERVAL, ECG07: 410 MS
QRS DURATION, ECG06: 76 MS
QTC CALCULATION (BEZET), ECG08: 419 MS
VENTRICULAR RATE, ECG03: 63 BPM